# Patient Record
Sex: FEMALE | URBAN - METROPOLITAN AREA
[De-identification: names, ages, dates, MRNs, and addresses within clinical notes are randomized per-mention and may not be internally consistent; named-entity substitution may affect disease eponyms.]

---

## 2017-01-16 NOTE — PATIENT DISCUSSION
10 17 16 IMPROVED, BUT STILL PROM - RECOM EYLEA THEN REEVAL IN JAN AND CONSIDER ADDITIONAL ILUVIEN IF EDEMA STILL PROM

## 2017-01-16 NOTE — PATIENT DISCUSSION
1 16 17 LESS EDEMA S/P EYLEA - VA BETTER - TO SEE HOW HE DOES W/OUT ADDITIONAL TX AND IF EDEMA ^ > 50 UM TO CONSIDER ILUVIEN

## 2017-01-16 NOTE — PATIENT DISCUSSION
3 21 15 NO IMPROVEMENT WITH EYLEA. MIN LEAKAGE ON FA COMPARED TO OCT FINDINGS. TO SEE HOW HE DOES WITH OBSERVATION AS PT HAD ILUVIEN IMPLANT TO SEE IF HE IMPROVES W/OUT ADDITIONAL ANTIVEGF AS DOES NOT SEEM TO BE HELPING. OTHER OPTION WOULD BE  OZURDEX, BUT LACK OF LEAKAGE MAKES ME BELIEVE THAT WOULD NOT ADD MUCH EITHER. THEREFOR, [address] FOR NOW.

## 2017-03-01 NOTE — PROCEDURE NOTE: CLINICAL
PROCEDURE NOTE: Intravitreal Iluvien OS. Diagnosis: Moderate Nonproliferative Diabetic Retinopathy. Patient has been previously treated with a course of corticosteroids and did not have a clinically significant rise in intraocular pressure. Anesthesia was achieved using drop(s) or injection checked above. Subconjunctival xylocaine 2% approx. 0.5cc was given for anesthesia. A drop of Proparacaine 0.5% was instilled followed by Povidone-iodine 5% over the injection site. The Iluvien drug implant (fluocinolone acetonide intravitreal implant 0.19mg) was injected into the vitreous cavity. The needle was passed 3.0 mm posterior to the limbus in pseudophakic patients, and 3.5 mm posterior to the limbus in phakic patients. The eye was stabilized using a q tip or cotton tip applicator, and the conjunctiva was displaced from the injection site. There was no evidence of hemorrhage, subretinal injection, or retinal detachment. Time of Injection 10:40AM. The eye was then irrigated with a sterile eye wash, the patient tolerated the procedure well, and there were no complications from the procedure. The patient was instructed to follow up in approximately 2 weeks for an exam and/or pressure check and was told to call immediately if the vision decreases and/or if the patient's eye becomes red, painful, and/or light sensitive. If the patient is unable to reach the doctor within an hour or two, the patient was instructed to go to the emergency room or call 911. Patient was instructed to return in 6 weeks for a followup exam. The patient was instructed to use Artificial Tears q.i.d. p.r.n for comfort. ILUVIEN IMPLANT WELL SEEN DURING INJECTION, BUT NOT WELL SEEN AFTER INJECTION WITH 20 D LENS. Javier Mccann

## 2017-03-01 NOTE — PATIENT DISCUSSION
INCREASED EDEMA > 50 UM FROM POINT OF LEAST EDEMA OVER LAST YEAR DESPITE ILUVIEN OVER 1 YEAR AGO - RECOM 2ND ILUVIEN IMPLANT PER FAME STUDY TO TRY AND REDUCE FREQUENCY OF EDEMA AND THE NEED FOR SUPPLEMENTAL EYLEA

## 2017-03-15 NOTE — PROCEDURE NOTE: CLINICAL
PROCEDURE NOTE: Eylea 2mg OD. Diagnosis: Diabetic Macular Edema. Anesthesia: Akten Gel 3.5%. Prep: Betadine Drops. Prior to injection, risks/benefits/alternatives discussed including infection, loss of vision, hemorrhage, cataract, glaucoma, retinal tears or detachment. The patient wished to proceed with treatment. Betadine prep was performed. Topical anesthesia was induced with Alcaine. Additional anesthesia was achieved using drop(s) or injection checked above. A drop of Povidone-iodine 5% ophthalmic solution was instilled over the injection site and in the inferior fornix. Using the syringe provided, Eylea 2.0mg in 0.05 cc was injected into the vitreous cavity. The remainder of the Eylea in the single-use vial was then discarded in a medical waste disposal container. The needle was passed 3.0 mm posterior to the limbus in pseudophakic patients, and 3.5 mm posterior to the limbus in phakic patients. Patient tolerated procedure well. There were no complications. Injection time: 11:05AM. The eye was irrigated with sterile irrigating solution. Post procedure instructions given. The patient was instructed to return for re-evaluation in approximately 4-12 weeks depending on his/her condition and was told to call immediately if vision decreases and/or if his/her eye becomes red, painful, and/or light sensitive. The patient was instructed to go to the emergency room or call 911 if unable to reach the doctor within an hour or two of trying or calling. The patient was instructed to use Artificial Tears q.i.d. p.r.n for comfort. Thuy Gonzáles

## 2017-03-22 ENCOUNTER — APPOINTMENT (RX ONLY)
Dept: RURAL CLINIC 4 | Facility: CLINIC | Age: 42
Setting detail: DERMATOLOGY
End: 2017-03-22

## 2017-03-22 DIAGNOSIS — D22 MELANOCYTIC NEVI: ICD-10-CM

## 2017-03-22 DIAGNOSIS — L57.8 OTHER SKIN CHANGES DUE TO CHRONIC EXPOSURE TO NONIONIZING RADIATION: ICD-10-CM

## 2017-03-22 DIAGNOSIS — D18.0 HEMANGIOMA: ICD-10-CM

## 2017-03-22 DIAGNOSIS — L82.1 OTHER SEBORRHEIC KERATOSIS: ICD-10-CM

## 2017-03-22 DIAGNOSIS — L21.8 OTHER SEBORRHEIC DERMATITIS: ICD-10-CM

## 2017-03-22 PROBLEM — D22.5 MELANOCYTIC NEVI OF TRUNK: Status: ACTIVE | Noted: 2017-03-22

## 2017-03-22 PROBLEM — L29.8 OTHER PRURITUS: Status: ACTIVE | Noted: 2017-03-22

## 2017-03-22 PROBLEM — M12.9 ARTHROPATHY, UNSPECIFIED: Status: ACTIVE | Noted: 2017-03-22

## 2017-03-22 PROBLEM — D18.01 HEMANGIOMA OF SKIN AND SUBCUTANEOUS TISSUE: Status: ACTIVE | Noted: 2017-03-22

## 2017-03-22 PROCEDURE — ? PRESCRIPTION

## 2017-03-22 PROCEDURE — 99203 OFFICE O/P NEW LOW 30 MIN: CPT

## 2017-03-22 PROCEDURE — ? COUNSELING

## 2017-03-22 RX ORDER — KETOCONAZOLE 20 MG/ML
SHAMPOO TOPICAL
Qty: 2 | Refills: 3 | Status: ERX | COMMUNITY
Start: 2017-03-22

## 2017-03-22 RX ORDER — KETOCONAZOLE 20.5 MG/ML
SHAMPOO, SUSPENSION TOPICAL
Qty: 1 | Refills: 6 | Status: CANCELLED
Stop reason: ENTERED-IN-ERROR

## 2017-03-22 RX ORDER — BETAMETHASONE DIPROPIONATE 0.5 MG/G
LOTION TOPICAL
Qty: 1 | Refills: 3 | Status: ERX | COMMUNITY
Start: 2017-03-22

## 2017-03-22 RX ADMIN — KETOCONAZOLE: 20 SHAMPOO TOPICAL at 19:04

## 2017-03-22 RX ADMIN — BETAMETHASONE DIPROPIONATE: 0.5 LOTION TOPICAL at 18:52

## 2017-03-22 ASSESSMENT — LOCATION SIMPLE DESCRIPTION DERM
LOCATION SIMPLE: ABDOMEN
LOCATION SIMPLE: HAIR
LOCATION SIMPLE: RIGHT FOREHEAD

## 2017-03-22 ASSESSMENT — LOCATION DETAILED DESCRIPTION DERM
LOCATION DETAILED: PERIUMBILICAL SKIN
LOCATION DETAILED: LEFT LATERAL ABDOMEN
LOCATION DETAILED: RIGHT INFERIOR MEDIAL FOREHEAD
LOCATION DETAILED: HAIR
LOCATION DETAILED: EPIGASTRIC SKIN

## 2017-03-22 ASSESSMENT — LOCATION ZONE DERM
LOCATION ZONE: TRUNK
LOCATION ZONE: SCALP
LOCATION ZONE: FACE

## 2017-03-22 NOTE — PROCEDURE: MIPS QUALITY
Quality 226: Preventive Care And Screening: Tobacco Use: Screening And Cessation Intervention: Patient screened for tobacco and never smoked
Detail Level: Detailed
Quality 111:Pneumonia Vaccination Status For Older Adults: Pneumococcal Vaccination not Administered or Previously Received, Reason not Otherwise Specified
Quality 131: Pain Assessment And Follow-Up: Pain assessment using a standardized tool is documented as negative, no follow-up plan required
Quality 130: Documentation Of Current Medications In The Medical Record: Current Medications Documented
Quality 47: Advance Care Plan: Advance care planning not documented, reason not otherwise specified.
Quality 110: Preventive Care And Screening: Influenza Immunization: Influenza Immunization previously received during influenza season

## 2018-10-15 ENCOUNTER — NEW PATIENT EMERGENCY (OUTPATIENT)
Dept: URBAN - METROPOLITAN AREA CLINIC 46 | Facility: CLINIC | Age: 43
End: 2018-10-15

## 2018-10-15 DIAGNOSIS — H01.026: ICD-10-CM

## 2018-10-15 DIAGNOSIS — H04.123: ICD-10-CM

## 2018-10-15 DIAGNOSIS — S05.02XA: ICD-10-CM

## 2018-10-15 DIAGNOSIS — H01.023: ICD-10-CM

## 2018-10-15 PROCEDURE — 99203 OFFICE O/P NEW LOW 30 MIN: CPT

## 2018-10-15 RX ORDER — PREDNISOLONE ACETATE 10 MG/ML: 1 SUSPENSION/ DROPS OPHTHALMIC

## 2018-10-15 ASSESSMENT — VISUAL ACUITY
OD_SC: 20/25
OS_PH: 20/20
OS_SC: 20/30
OD_PH: 20/20

## 2018-10-15 ASSESSMENT — TONOMETRY
OD_IOP_MMHG: 17
OS_IOP_MMHG: 17

## 2019-11-25 ENCOUNTER — ESTABLISHED COMPREHENSIVE EXAM (OUTPATIENT)
Dept: URBAN - METROPOLITAN AREA CLINIC 46 | Facility: CLINIC | Age: 44
End: 2019-11-25

## 2019-11-25 DIAGNOSIS — H52.4: ICD-10-CM

## 2019-11-25 PROCEDURE — 92015 DETERMINE REFRACTIVE STATE: CPT

## 2019-11-25 PROCEDURE — 92014 COMPRE OPH EXAM EST PT 1/>: CPT

## 2019-11-25 ASSESSMENT — VISUAL ACUITY
OS_SC: J3
OD_SC: 20/25
OS_SC: 20/25
OD_SC: J3

## 2019-11-25 ASSESSMENT — TONOMETRY
OD_IOP_MMHG: 14
OS_IOP_MMHG: 14

## 2021-02-18 ENCOUNTER — ESTABLISHED COMPREHENSIVE EXAM (OUTPATIENT)
Dept: URBAN - METROPOLITAN AREA CLINIC 46 | Facility: CLINIC | Age: 46
End: 2021-02-18

## 2021-02-18 DIAGNOSIS — H52.4: ICD-10-CM

## 2021-02-18 DIAGNOSIS — Z01.00: ICD-10-CM

## 2021-02-18 PROCEDURE — 92014 COMPRE OPH EXAM EST PT 1/>: CPT

## 2021-02-18 PROCEDURE — 92015 DETERMINE REFRACTIVE STATE: CPT

## 2021-02-18 ASSESSMENT — TONOMETRY
OD_IOP_MMHG: 14
OS_IOP_MMHG: 14

## 2021-02-18 ASSESSMENT — VISUAL ACUITY
OD_SC: J2
OU_SC: 20/20
OS_SC: 20/25
OU_SC: J1
OD_SC: 20/20
OS_SC: J1

## 2023-07-24 ENCOUNTER — EMERGENCY VISIT (OUTPATIENT)
Dept: URBAN - METROPOLITAN AREA CLINIC 46 | Facility: CLINIC | Age: 48
End: 2023-07-24

## 2023-07-24 DIAGNOSIS — H04.123: ICD-10-CM

## 2023-07-24 DIAGNOSIS — S05.02XA: ICD-10-CM

## 2023-07-24 DIAGNOSIS — H01.02A: ICD-10-CM

## 2023-07-24 DIAGNOSIS — H01.02B: ICD-10-CM

## 2023-07-24 DIAGNOSIS — Z98.890: ICD-10-CM

## 2023-07-24 DIAGNOSIS — S05.01XA: ICD-10-CM

## 2023-07-24 PROCEDURE — 92012 INTRM OPH EXAM EST PATIENT: CPT

## 2023-07-24 ASSESSMENT — TONOMETRY
OD_IOP_MMHG: 17
OS_IOP_MMHG: 14

## 2023-07-24 ASSESSMENT — VISUAL ACUITY
OD_SC: 20/20
OS_SC: 20/20

## 2024-01-05 ENCOUNTER — COMPREHENSIVE EXAM (OUTPATIENT)
Dept: URBAN - METROPOLITAN AREA CLINIC 47 | Facility: CLINIC | Age: 49
End: 2024-01-05

## 2024-01-05 DIAGNOSIS — H52.7: ICD-10-CM

## 2024-01-05 DIAGNOSIS — Z01.00: ICD-10-CM

## 2024-01-05 PROCEDURE — 92015 DETERMINE REFRACTIVE STATE: CPT

## 2024-01-05 PROCEDURE — 99199RRD RESIDENT RENDERING PROVIDER

## 2024-01-05 PROCEDURE — 92014 COMPRE OPH EXAM EST PT 1/>: CPT

## 2024-01-05 ASSESSMENT — VISUAL ACUITY
OS_CC: J1
OS_SC: 20/30-2
OS_PH: 20/20
OD_SC: 20/25
OD_CC: J1

## 2024-01-05 ASSESSMENT — TONOMETRY
OS_IOP_MMHG: 14
OD_IOP_MMHG: 14

## 2024-03-15 ENCOUNTER — FOLLOW UP (OUTPATIENT)
Dept: URBAN - METROPOLITAN AREA CLINIC 47 | Facility: CLINIC | Age: 49
End: 2024-03-15

## 2024-03-15 ENCOUNTER — EMERGENCY VISIT (OUTPATIENT)
Dept: URBAN - METROPOLITAN AREA CLINIC 43 | Facility: CLINIC | Age: 49
End: 2024-03-15

## 2024-03-15 DIAGNOSIS — H01.02A: ICD-10-CM

## 2024-03-15 DIAGNOSIS — H04.123: ICD-10-CM

## 2024-03-15 DIAGNOSIS — H01.02B: ICD-10-CM

## 2024-03-15 PROCEDURE — 66999PO NON CO-MANAGED OTHER SURGERY PO

## 2024-03-15 PROCEDURE — 99213 OFFICE O/P EST LOW 20 MIN: CPT

## 2024-03-15 PROCEDURE — 68761S PUNCTUM PLUG / SILICONE,EACH

## 2024-03-15 PROCEDURE — A4263 PERMANENT TEAR DUCT PLUG: HCPCS

## 2024-03-15 RX ORDER — CYCLOSPORINE OPHTHALMIC SOLUTION 1 MG/ML: 1 SOLUTION/ DROPS OPHTHALMIC TWICE A DAY

## 2024-03-15 ASSESSMENT — VISUAL ACUITY
OS_SC: 20/20
OD_SC: 20/25
OS_SC: 20/20
OD_SC: 20/20

## 2024-03-15 ASSESSMENT — TONOMETRY
OS_IOP_MMHG: 11
OD_IOP_MMHG: 12